# Patient Record
(demographics unavailable — no encounter records)

---

## 2025-01-30 NOTE — HISTORY OF PRESENT ILLNESS
[de-identified] : Patient is a 67 y/o F who presents a chief complaint of right breast usual ductal hyperplasia. Denies palpable breast masses, nipple discharge, nipple retraction/inversion, or skin changes.   Stereotactic bx 12/31/24 Right breast 10:00 - breast tissue with usual ductal hyperplasia, columnar cell change, focal apocrine metaplasia and ecstatic duct, calcifications in association with microcysts and lobules -BENIGN & CONCORDANT (rec annual imaging)  Diagnostic [facility]/US 11/14/24 - Rec stereotactic bx for indeterminate grouped calcifications in the upper outer quadrant right breast. (BIRADS 4)  Prior benign left breast biopsy. HRT use.  Denies any family hx of breast cancer.

## 2025-01-30 NOTE — HISTORY OF PRESENT ILLNESS
[de-identified] : Patient is a 67 y/o F who presents a chief complaint of right breast usual ductal hyperplasia. Denies palpable breast masses, nipple discharge, nipple retraction/inversion, or skin changes.   Stereotactic bx 12/31/24 Right breast 10:00 - breast tissue with usual ductal hyperplasia, columnar cell change, focal apocrine metaplasia and ecstatic duct, calcifications in association with microcysts and lobules -BENIGN & CONCORDANT (rec annual imaging)  Diagnostic [facility]/US 11/14/24 - Rec stereotactic bx for indeterminate grouped calcifications in the upper outer quadrant right breast. (BIRADS 4)  Prior benign left breast biopsy. HRT use.  Denies any family hx of breast cancer.

## 2025-01-30 NOTE — PHYSICAL EXAM
[Normal] : supple, no neck mass and thyroid not enlarged [Normal Neck Lymph Nodes] : normal neck lymph nodes  [Normal Supraclavicular Lymph Nodes] : normal supraclavicular lymph nodes [Normal Groin Lymph Nodes] : normal groin lymph nodes [Normal Axillary Lymph Nodes] : normal axillary lymph nodes [Normal] : oriented to person, place and time, with appropriate affect [de-identified] : no masses or adenopathy bilaterally

## 2025-01-30 NOTE — PHYSICAL EXAM
[Normal] : supple, no neck mass and thyroid not enlarged [Normal Neck Lymph Nodes] : normal neck lymph nodes  [Normal Supraclavicular Lymph Nodes] : normal supraclavicular lymph nodes [Normal Groin Lymph Nodes] : normal groin lymph nodes [Normal Axillary Lymph Nodes] : normal axillary lymph nodes [Normal] : oriented to person, place and time, with appropriate affect [de-identified] : no masses or adenopathy bilaterally

## 2025-01-30 NOTE — ASSESSMENT
[FreeTextEntry1] : S/p stereotactic bx right breast - usual ductal hyperplasia (benign & concordant) Reassured patient that index of suspicion for malignancy is low  Cont yearly breast imaging 12/2025  RTO 1 year  If stable, continue follow up with GYN

## 2025-01-30 NOTE — CONSULT LETTER
[Dear  ___] : Dear  [unfilled], [Consult Letter:] : I had the pleasure of evaluating your patient, [unfilled]. [Please see my note below.] : Please see my note below. [Sincerely,] : Sincerely, [FreeTextEntry3] : Marshal Welch MD FACS

## 2025-01-30 NOTE — ADDENDUM
[FreeTextEntry1] : I, Mary Hernandez, acted solely as a scribe for Dr. Marshal Welch on this date 01/24/2025.